# Patient Record
Sex: FEMALE | Race: BLACK OR AFRICAN AMERICAN | NOT HISPANIC OR LATINO
[De-identification: names, ages, dates, MRNs, and addresses within clinical notes are randomized per-mention and may not be internally consistent; named-entity substitution may affect disease eponyms.]

---

## 2020-08-27 ENCOUNTER — DASHBOARD ENCOUNTERS (OUTPATIENT)
Age: 40
End: 2020-08-27

## 2020-08-27 ENCOUNTER — OFFICE VISIT (OUTPATIENT)
Dept: URBAN - METROPOLITAN AREA TELEHEALTH 2 | Facility: TELEHEALTH | Age: 40
End: 2020-08-27
Payer: COMMERCIAL

## 2020-08-27 ENCOUNTER — LAB OUTSIDE AN ENCOUNTER (OUTPATIENT)
Dept: URBAN - METROPOLITAN AREA TELEHEALTH 2 | Facility: TELEHEALTH | Age: 40
End: 2020-08-27

## 2020-08-27 DIAGNOSIS — Z90.49 HISTORY OF HEMICOLECTOMY: ICD-10-CM

## 2020-08-27 DIAGNOSIS — D48.1 DESMOID TUMOR: ICD-10-CM

## 2020-08-27 DIAGNOSIS — K50.80 CROHN'S DISEASE OF BOTH SMALL AND LARGE INTESTINE: ICD-10-CM

## 2020-08-27 PROBLEM — 428305005 HISTORY OF HEMICOLECTOMY: Status: ACTIVE | Noted: 2020-08-27

## 2020-08-27 PROBLEM — 725049005 DESMOID TUMOR: Status: ACTIVE | Noted: 2020-08-27

## 2020-08-27 PROBLEM — 71833008 CROHN'S DISEASE OF SMALL AND LARGE INTESTINES: Status: ACTIVE | Noted: 2020-08-27

## 2020-08-27 PROCEDURE — 1036F TOBACCO NON-USER: CPT | Performed by: INTERNAL MEDICINE

## 2020-08-27 PROCEDURE — G8420 CALC BMI NORM PARAMETERS: HCPCS | Performed by: INTERNAL MEDICINE

## 2020-08-27 PROCEDURE — G8427 DOCREV CUR MEDS BY ELIG CLIN: HCPCS | Performed by: INTERNAL MEDICINE

## 2020-08-27 PROCEDURE — 99204 OFFICE O/P NEW MOD 45 MIN: CPT | Performed by: INTERNAL MEDICINE

## 2020-08-27 PROCEDURE — G9903 PT SCRN TBCO ID AS NON USER: HCPCS | Performed by: INTERNAL MEDICINE

## 2020-08-27 RX ORDER — AZATHIOPRINE 50 1/1
TAKE 2 TABLETS (100 MG) BY ORAL ROUTE ONCE DAILY FOR 90 DAYS TABLET ORAL 1
Qty: 180 | Refills: 1 | Status: ACTIVE | COMMUNITY
Start: 2018-01-18 | End: 1900-01-01

## 2020-08-27 NOTE — PHYSICAL EXAM HENT:
Head , normocephalic , atraumatic , Face , Face within normal limits , Ears , External ears within normal limits , Nose/Nasopharynx , External nose  normal appearance , Mouth and Throat , Oral cavity appearance normal We will have some labs done next week, they will call you to set up an appt     Continue to work on your diet and good luck with getting up in your chair     No adjustments made in your meds today    I will see you again in one month, hopefully with our PharmD

## 2020-08-27 NOTE — HPI-OTHER HISTORIES
The patient presents for evaluation of colonoscopy; h/o ileo-colonic crohn's disease complicated by bowel stricture/obstruction 2/2 pill cam years ago s/p right hemicolectomy.  last seen in gi clinic over 3 years ago; h/o desmoid tumor in the past.  last colonoscopy 3 years ago (ulcerations at anastomosis); currently not on tx for CD as clinically doing well regarding gi sx's.  Denies abd pain, bowel habit changes/diarrhea, gi bleeding, or weight loss.

## 2020-11-10 ENCOUNTER — OFFICE VISIT (OUTPATIENT)
Dept: URBAN - METROPOLITAN AREA SURGERY CENTER 23 | Facility: SURGERY CENTER | Age: 40
End: 2020-11-10
Payer: COMMERCIAL

## 2020-11-10 DIAGNOSIS — K50.10 CC (CROHN'S COLITIS): ICD-10-CM

## 2020-11-10 PROCEDURE — 45380 COLONOSCOPY AND BIOPSY: CPT | Performed by: INTERNAL MEDICINE

## 2020-11-10 PROCEDURE — G8907 PT DOC NO EVENTS ON DISCHARG: HCPCS | Performed by: INTERNAL MEDICINE

## 2020-11-10 PROCEDURE — G9937 DIG OR SURV COLSCO: HCPCS | Performed by: INTERNAL MEDICINE

## 2020-11-10 RX ORDER — AZATHIOPRINE 50 1/1
TAKE 2 TABLETS (100 MG) BY ORAL ROUTE ONCE DAILY FOR 90 DAYS TABLET ORAL 1
Qty: 180 | Refills: 1 | Status: ACTIVE | COMMUNITY
Start: 2018-01-18 | End: 1900-01-01

## 2020-11-13 ENCOUNTER — OFFICE VISIT (OUTPATIENT)
Dept: URBAN - METROPOLITAN AREA SURGERY CENTER 23 | Facility: SURGERY CENTER | Age: 40
End: 2020-11-13